# Patient Record
Sex: MALE | Race: WHITE | NOT HISPANIC OR LATINO | URBAN - METROPOLITAN AREA
[De-identification: names, ages, dates, MRNs, and addresses within clinical notes are randomized per-mention and may not be internally consistent; named-entity substitution may affect disease eponyms.]

---

## 2024-01-09 ENCOUNTER — CONSULT (OUTPATIENT)
Dept: ENDOCRINOLOGY | Facility: CLINIC | Age: 34
End: 2024-01-09
Payer: COMMERCIAL

## 2024-01-09 DIAGNOSIS — Z31.41 FERTILITY TESTING: Primary | ICD-10-CM

## 2024-01-09 PROCEDURE — 99212 OFFICE O/P EST SF 10 MIN: CPT | Performed by: OBSTETRICS & GYNECOLOGY

## 2024-01-09 PROCEDURE — 99202 OFFICE O/P NEW SF 15 MIN: CPT | Performed by: OBSTETRICS & GYNECOLOGY

## 2024-01-09 NOTE — PROGRESS NOTES
In Person    HPI    Paul is a 33 y.o. male who desires to proceed with fertility testing.  His partner (Jessika Ackerman) is interested in Fallopian tube reanastomosis.     Name: Paul Castorena    : [birth year]    Occupation:     Prior fertility history:  Fertility History:: None    PMH: Past Medical History:: None    PSH: Past Surgical History:: None    Smoking: Recent or current tobacco use: Yes    Alcohol Use: Recent or current alcohol use: Yes    Drug Use: Recent or current drug use: No    Medications: none      Injuries: Any history of surgeries or injuries in the reproductive area?: No    STD: Have you ever been diagnosed with a sexually transmitted disease?: No    Please select all that are applicable:    SA: Prior Semen Analysis completed?: No    BMI:   BMI Readings from Last 1 Encounters:   No data found for BMI     VITALS:  There were no vitals taken for this visit.  LMP: No LMP for male patient.    ASSESSMENT   Paul is a 33 y.o. male who desires to proceed with fertility testing.  His partner (Jessika Ackerman) is interested in Fallopian tube reanastomosis.     PLAN  Orders Placed This Encounter   Procedures    POCT Semen Analysis Complete with Strict Morphology   [X ] consider STD testing based on work up results for this couple      FOLLOW UP   - follow up once workup is complete    Ben Daly  2024  3:33 PM

## 2024-01-16 ENCOUNTER — ANCILLARY PROCEDURE (OUTPATIENT)
Dept: ENDOCRINOLOGY | Facility: CLINIC | Age: 34
End: 2024-01-16
Payer: COMMERCIAL

## 2024-01-16 DIAGNOSIS — Z31.41 FERTILITY TESTING: ICD-10-CM

## 2024-01-16 LAB
% EX RESIDUAL CYTOPLASM (SEMEN): 0 %
% HEAD DEFECTS (SEMEN): 96.8 %
% NECK MIDPIECE (SEMEN): 24.5 %
% NORMAL (SEMEN): 3.3 % (ref 4–?)
% TAIL DEFECTS (SEMEN): 4.8 %
ABSTINENCE (DAYS): 3 DAYS (ref 2–7)
AGGLUTINATION (SEMEN): NO
AMOUNT MISSED:: ABNORMAL
ANALYZED TIME:: ABNORMAL
ANDROLOGY LAB ID#: ABNORMAL
CLUMPS (SEMEN): YES
COLLECTED COMPLETELY: NO
COLLECTION LOCATION:: ABNORMAL
COLLECTION METHOD:: ABNORMAL
CONCENTRATION(SEMEN): 75.47 MILL/ML (ref 15–?)
DEBRIS (SEMEN): YES
LEUKOCYTE (SEMEN): NEGATIVE
NON PROG. MOTILITY (SEMEN): 10 %
PORTION MISSED REI: ABNORMAL
PROG. MOTILITY (SEMEN): 43 % (ref 32–?)
RECEIVED TIME:: ABNORMAL
REI PARTNER DOB: ABNORMAL
REI PARTNER NAME: ABNORMAL
SEMEN APPEARANCE: NORMAL
SEMEN LIQUEFACTION: NORMAL
SEMEN VISCOSITY: ABNORMAL
TOT. NO OF NORM. MOTILE SPERM (SEMEN): 7.11 MILL
TOT. NO OF NORM. SPERM (SEMEN): 3.75 MILL
TOTAL MOTILITY (SEMEN): 53 % (ref 40–?)
TOTAL NO OF MOTILE (SEMEN): 115.45 MILL
TOTAL NO OF RND CELLS (SEMEN): 1.5 MILL (ref ?–5)
TOTAL NO OF SPERM (SEMEN): 218.87 MILL (ref 39–?)
VOLUME (SEMEN): 2.9 ML (ref 1.5–?)

## 2024-01-16 PROCEDURE — 89322 SEMEN ANAL STRICT CRITERIA: CPT | Performed by: OBSTETRICS & GYNECOLOGY
